# Patient Record
(demographics unavailable — no encounter records)

---

## 2025-01-06 NOTE — HISTORY OF PRESENT ILLNESS
[FreeTextEntry1] : This 10-year-old is seen today for evaluation of the left wrist.  She was well until approximately December 8 when she tripped and fell sustaining injury.  She was placed into a splint at [facility] the following day after x-rays revealed a fracture.  She is much more comfortable on today's visit.  Past history is otherwise noncontributory

## 2025-01-06 NOTE — ASSESSMENT
[FreeTextEntry1] : Impression: Fracture left distal radius.  This patient will discontinue the wrist splint and will return to gym and sports in 2 weeks return here as needed

## 2025-01-06 NOTE — PHYSICAL EXAM
[FreeTextEntry1] : Exam today out of the splint she has minimal swelling good motion to the wrist in all planes minimal discomfort if any compartments are soft neurovascular status is intact the elbow is nontender.  Review of x-rays [facility] December 9, 2024 3 views left wrist revealed a well aligned fracture of the distal radius  X-rays ordered and taken today 3 views of the left wrist reveal healed distal radius fracture